# Patient Record
Sex: MALE | Race: WHITE | NOT HISPANIC OR LATINO | URBAN - METROPOLITAN AREA
[De-identification: names, ages, dates, MRNs, and addresses within clinical notes are randomized per-mention and may not be internally consistent; named-entity substitution may affect disease eponyms.]

---

## 2024-05-08 ENCOUNTER — EMERGENCY (EMERGENCY)
Facility: HOSPITAL | Age: 59
LOS: 1 days | Discharge: HOME | End: 2024-05-08
Attending: STUDENT IN AN ORGANIZED HEALTH CARE EDUCATION/TRAINING PROGRAM
Payer: MEDICARE

## 2024-05-08 VITALS
HEIGHT: 66 IN | SYSTOLIC BLOOD PRESSURE: 117 MMHG | DIASTOLIC BLOOD PRESSURE: 76 MMHG | RESPIRATION RATE: 18 BRPM | HEART RATE: 80 BPM | TEMPERATURE: 99 F | OXYGEN SATURATION: 98 % | WEIGHT: 199.96 LBS

## 2024-05-08 VITALS
RESPIRATION RATE: 18 BRPM | TEMPERATURE: 98 F | DIASTOLIC BLOOD PRESSURE: 75 MMHG | OXYGEN SATURATION: 99 % | HEART RATE: 71 BPM | SYSTOLIC BLOOD PRESSURE: 122 MMHG

## 2024-05-08 LAB
ALBUMIN SERPL ELPH-MCNC: 3.4 G/DL — LOW (ref 3.5–5)
ALP SERPL-CCNC: 57 U/L — SIGNIFICANT CHANGE UP (ref 40–120)
ALT FLD-CCNC: 45 U/L DA — SIGNIFICANT CHANGE UP (ref 10–60)
ANION GAP SERPL CALC-SCNC: 5 MMOL/L — SIGNIFICANT CHANGE UP (ref 5–17)
AST SERPL-CCNC: 30 U/L — SIGNIFICANT CHANGE UP (ref 10–40)
BASOPHILS # BLD AUTO: 0.03 K/UL — SIGNIFICANT CHANGE UP (ref 0–0.2)
BASOPHILS NFR BLD AUTO: 0.8 % — SIGNIFICANT CHANGE UP (ref 0–2)
BILIRUB SERPL-MCNC: 0.4 MG/DL — SIGNIFICANT CHANGE UP (ref 0.2–1.2)
BUN SERPL-MCNC: 18 MG/DL — SIGNIFICANT CHANGE UP (ref 7–18)
CALCIUM SERPL-MCNC: 8.7 MG/DL — SIGNIFICANT CHANGE UP (ref 8.4–10.5)
CHLORIDE SERPL-SCNC: 103 MMOL/L — SIGNIFICANT CHANGE UP (ref 96–108)
CO2 SERPL-SCNC: 27 MMOL/L — SIGNIFICANT CHANGE UP (ref 22–31)
CREAT SERPL-MCNC: 0.75 MG/DL — SIGNIFICANT CHANGE UP (ref 0.5–1.3)
EGFR: 103 ML/MIN/1.73M2 — SIGNIFICANT CHANGE UP
EOSINOPHIL # BLD AUTO: 0 K/UL — SIGNIFICANT CHANGE UP (ref 0–0.5)
EOSINOPHIL NFR BLD AUTO: 0 % — SIGNIFICANT CHANGE UP (ref 0–6)
GLUCOSE SERPL-MCNC: 115 MG/DL — HIGH (ref 70–99)
HCT VFR BLD CALC: 41.6 % — SIGNIFICANT CHANGE UP (ref 39–50)
HGB BLD-MCNC: 14.2 G/DL — SIGNIFICANT CHANGE UP (ref 13–17)
IMM GRANULOCYTES NFR BLD AUTO: 1.3 % — HIGH (ref 0–0.9)
LACTATE SERPL-SCNC: 1.3 MMOL/L — SIGNIFICANT CHANGE UP (ref 0.7–2)
LYMPHOCYTES # BLD AUTO: 1.23 K/UL — SIGNIFICANT CHANGE UP (ref 1–3.3)
LYMPHOCYTES # BLD AUTO: 31 % — SIGNIFICANT CHANGE UP (ref 13–44)
MAGNESIUM SERPL-MCNC: 1.8 MG/DL — SIGNIFICANT CHANGE UP (ref 1.6–2.6)
MANUAL SMEAR VERIFICATION: SIGNIFICANT CHANGE UP
MCHC RBC-ENTMCNC: 30.2 PG — SIGNIFICANT CHANGE UP (ref 27–34)
MCHC RBC-ENTMCNC: 34.1 GM/DL — SIGNIFICANT CHANGE UP (ref 32–36)
MCV RBC AUTO: 88.5 FL — SIGNIFICANT CHANGE UP (ref 80–100)
MONOCYTES # BLD AUTO: 0.99 K/UL — HIGH (ref 0–0.9)
MONOCYTES NFR BLD AUTO: 24.9 % — HIGH (ref 2–14)
NEUTROPHILS # BLD AUTO: 1.67 K/UL — LOW (ref 1.8–7.4)
NEUTROPHILS NFR BLD AUTO: 42 % — LOW (ref 43–77)
NRBC # BLD: 0 /100 WBCS — SIGNIFICANT CHANGE UP (ref 0–0)
PHOSPHATE SERPL-MCNC: 3 MG/DL — SIGNIFICANT CHANGE UP (ref 2.5–4.5)
PLAT MORPH BLD: NORMAL — SIGNIFICANT CHANGE UP
PLATELET # BLD AUTO: 73 K/UL — LOW (ref 150–400)
PLATELET COUNT - ESTIMATE: ABNORMAL
POTASSIUM SERPL-MCNC: 4.2 MMOL/L — SIGNIFICANT CHANGE UP (ref 3.5–5.3)
POTASSIUM SERPL-SCNC: 4.2 MMOL/L — SIGNIFICANT CHANGE UP (ref 3.5–5.3)
PROT SERPL-MCNC: 6.6 G/DL — SIGNIFICANT CHANGE UP (ref 6–8.3)
RBC # BLD: 4.7 M/UL — SIGNIFICANT CHANGE UP (ref 4.2–5.8)
RBC # FLD: 12.8 % — SIGNIFICANT CHANGE UP (ref 10.3–14.5)
RBC BLD AUTO: NORMAL — SIGNIFICANT CHANGE UP
SODIUM SERPL-SCNC: 135 MMOL/L — SIGNIFICANT CHANGE UP (ref 135–145)
WBC # BLD: 3.97 K/UL — SIGNIFICANT CHANGE UP (ref 3.8–10.5)
WBC # FLD AUTO: 3.97 K/UL — SIGNIFICANT CHANGE UP (ref 3.8–10.5)

## 2024-05-08 PROCEDURE — 96374 THER/PROPH/DIAG INJ IV PUSH: CPT

## 2024-05-08 PROCEDURE — 36415 COLL VENOUS BLD VENIPUNCTURE: CPT

## 2024-05-08 PROCEDURE — 83605 ASSAY OF LACTIC ACID: CPT

## 2024-05-08 PROCEDURE — 85025 COMPLETE CBC W/AUTO DIFF WBC: CPT

## 2024-05-08 PROCEDURE — 93005 ELECTROCARDIOGRAM TRACING: CPT

## 2024-05-08 PROCEDURE — 99285 EMERGENCY DEPT VISIT HI MDM: CPT | Mod: 25

## 2024-05-08 PROCEDURE — 80053 COMPREHEN METABOLIC PANEL: CPT

## 2024-05-08 PROCEDURE — 70450 CT HEAD/BRAIN W/O DYE: CPT | Mod: MC

## 2024-05-08 PROCEDURE — 99285 EMERGENCY DEPT VISIT HI MDM: CPT

## 2024-05-08 PROCEDURE — 84100 ASSAY OF PHOSPHORUS: CPT

## 2024-05-08 PROCEDURE — 83735 ASSAY OF MAGNESIUM: CPT

## 2024-05-08 PROCEDURE — 70450 CT HEAD/BRAIN W/O DYE: CPT | Mod: 26,MC

## 2024-05-08 RX ORDER — LIDOCAINE 4 G/100G
1 CREAM TOPICAL ONCE
Refills: 0 | Status: COMPLETED | OUTPATIENT
Start: 2024-05-08 | End: 2024-05-08

## 2024-05-08 RX ORDER — LEVETIRACETAM 250 MG/1
1500 TABLET, FILM COATED ORAL ONCE
Refills: 0 | Status: COMPLETED | OUTPATIENT
Start: 2024-05-08 | End: 2024-05-08

## 2024-05-08 RX ORDER — IBUPROFEN 200 MG
600 TABLET ORAL ONCE
Refills: 0 | Status: COMPLETED | OUTPATIENT
Start: 2024-05-08 | End: 2024-05-08

## 2024-05-08 RX ADMIN — LIDOCAINE 1 PATCH: 4 CREAM TOPICAL at 05:35

## 2024-05-08 RX ADMIN — LIDOCAINE 1 PATCH: 4 CREAM TOPICAL at 07:37

## 2024-05-08 RX ADMIN — Medication 600 MILLIGRAM(S): at 06:57

## 2024-05-08 RX ADMIN — Medication 600 MILLIGRAM(S): at 05:35

## 2024-05-08 RX ADMIN — LEVETIRACETAM 400 MILLIGRAM(S): 250 TABLET, FILM COATED ORAL at 04:10

## 2024-05-08 NOTE — ED PROVIDER NOTE - OBJECTIVE STATEMENT
58-year-old male, history of epilepsy on Keppra 500 twice daily,  presenting with concern for seizure.  Patient states that he thinks he may have had a seizure while trying to get out of bed.  Denies any loss of consciousness, tongue biting, or bowel/urinary incontinence.  Denies any head injury or loss of consciousness. No chest pain or SOB.  No fever and no chills. Pt states he has baseline tremors.   Denies any alcohol use or history of withdrawal. Patient poor historian.

## 2024-05-08 NOTE — ED PROVIDER NOTE - CLINICAL SUMMARY MEDICAL DECISION MAKING FREE TEXT BOX
58-year-old male, history of epilepsy on Keppra 500 twice daily,  presenting with concern for seizure. VSS. pt well appearing, AOx3. Neuro exam WNL, tremulous/parkinsonian-like (states this is his baseline). no tongue biting, no incontinence to suggest seizure, likely syncope. Keppra loaded empirically. CT head. EKG NSR, no WPW, Brugada, short/long QT, no AV block. Imaging, labs, reassess.

## 2024-05-08 NOTE — ED PROVIDER NOTE - NSFOLLOWUPINSTRUCTIONS_ED_ALL_ED_FT
– Take Keppra 750mg twice a day (increased from your baseline 500mg twice a day. If you already take 750mg twice a day, take 1000 mg twice a day).   – Follow up with neurologist in 2 to 3 days.   – Follow up with cardiologist in 2 to 3 days.   – Return for any worsening or concerning symptoms (see below).  – Follow up with primary care doctor in the next 5 to 7 days.    DISCHARGE INSTRUCTIONS:    Seek care immediately if:    You have sudden chest pain.    You have trouble breathing or shortness of breath.    You have vision changes, are sweating, and have nausea while you are sitting or lying down.    You feel dizzy or flushed and your heart is fluttering.    You lose consciousness.    You cannot use your arm, hand, foot, or leg, or it feels weak.    You have trouble speaking or understanding others when they speak.  Contact your healthcare provider if:    You have new or worsening symptoms.    Your heart beats faster or slower than usual.    You have questions or concerns about your condition or care.  Medicines:    Medicines may be needed to help your heart pump strongly and regularly. Your healthcare provider may also make changes to any medicines that are causing syncope.    Take your medicine as directed. Contact your healthcare provider if you think your medicine is not helping or if you have side effects. Tell him or her if you are allergic to any medicine. Keep a list of the medicines, vitamins, and herbs you take. Include the amounts, and when and why you take them. Bring the list or the pill bottles to follow-up visits. Carry your medicine list with you in case of an emergency.  Follow up with your healthcare provider as directed: You may need more tests to help find the cause of your near syncope episodes. The tests will help healthcare providers plan the best treatment for you. Write down your questions so you remember to ask them during your visits.    Manage near syncope:    Sit or lie down when needed. This includes when you feel dizzy, your throat is getting tight, and your vision changes. Raise your legs above the level of your heart.    Take slow, deep breaths if you start to breathe faster with anxiety or fear. This can help decrease dizziness and the feeling that you might faint.    Keep a record of your near syncope episodes. Include your symptoms and your activity before and after the episode. The record can help your healthcare provider find the cause of your near syncope and help you manage episodes.  Prevent a near syncope episode:    Move slowly and let yourself get used to one position before you move to another position. This is very important when you change from a lying or sitting position to a standing position. Take some deep breaths before you stand up from a lying position. Stand up slowly. Sudden movements may cause a fainting spell. Sit on the side of the bed or couch for a few minutes before you stand up. If you are on bedrest, try to be upright for about 2 hours each day, or as directed. Do not lock your legs if you are standing for a long period of time. Move your legs and bend your knees to keep blood flowing.    Follow your healthcare provider's recommendations. Your provider may recommend that you drink more liquids to prevent dehydration. You may also need to have more salt to keep your blood pressure from dropping too low and causing syncope. Your provider will tell you how much liquid and sodium to have each day. He or she will also tell you how much physical activity is safe for you. This will depend on what is causing your near syncope.    Watch for signs of low blood sugar. These include hunger, nervousness, sweating, and fast or fluttery heartbeats. Talk with your healthcare provider about ways to keep your blood sugar level steady.    Check your blood pressure often. This is important if you take medicine to lower your blood pressure. Check your blood pressure when you are lying down and when you are standing. Ask how often to check during the day. Keep a record of your blood pressure numbers. Your healthcare provider may use the record to help plan your treatment.  How to take a Blood Pressure      Do not strain if you are constipated. You may faint if you strain to have a bowel movement. Walking is the best way to get your bowels moving. Eat foods high in fiber to make it easier to have a bowel movement. Good examples are high-fiber cereals, beans, vegetables, and whole-grain breads. Prune juice may help make bowel movements softer.      Do not exercise outside on a hot day. The combination of physical activity and heat can lead to dehydration. This can cause syncope.

## 2024-05-08 NOTE — ED PROVIDER NOTE - PATIENT PORTAL LINK FT
You can access the FollowMyHealth Patient Portal offered by [facility] by registering at the following website: [email or web address]. By joining [employer]’s FollowMyHealth portal, you will also be able to view your health information using other applications (apps) compatible with our system.

## 2024-05-08 NOTE — ED ADULT NURSE REASSESSMENT NOTE - NS ED NURSE REASSESS COMMENT FT1
Patient resting in bed, A&OX3, awaiting ambulate transportation service. No acute distress noted, denies chest pain, no SOB.

## 2024-05-08 NOTE — ED PROVIDER NOTE - PHYSICAL EXAMINATION
Gen: NAD  Head: NCAT  Eyes: EOMI, PERRLA  ENT: no tongue biting  Resp: CTAB, no W/R/R  CV: RRR, +S1/S2, no M/R/G  GI: Abdomen soft non-distended, NTTP, no masses  MSK: No open wounds, no bruising, no lower extremity edema  Neuro: +upper ext tremors. A&Ox3, sensation nl, MAEx4, follows commands. able to ambulate  Ext: no edema, no deformity, warm and well-perfused  Skin: no rash or bruising

## 2024-05-08 NOTE — ED PROVIDER NOTE - PROGRESS NOTE DETAILS
Labs reviewed, all within normal limits, normal EKG.   CT head negative.   instructed patient to take 750 Keppra twice a day and instead of 500 twice a day, to follow-up with neurologist in the next 2 to 3 days.  Patient verbalized understanding.  Will send patient back to [facility].  Will write  medications  and instructions on discharge paperwork.

## 2025-01-15 ENCOUNTER — EMERGENCY (EMERGENCY)
Facility: HOSPITAL | Age: 60
LOS: 1 days | Discharge: HOME | End: 2025-01-15
Attending: STUDENT IN AN ORGANIZED HEALTH CARE EDUCATION/TRAINING PROGRAM
Payer: MEDICARE

## 2025-01-15 VITALS
DIASTOLIC BLOOD PRESSURE: 70 MMHG | WEIGHT: 179.9 LBS | HEIGHT: 69 IN | SYSTOLIC BLOOD PRESSURE: 147 MMHG | HEART RATE: 84 BPM | RESPIRATION RATE: 18 BRPM | OXYGEN SATURATION: 96 %

## 2025-01-15 VITALS
RESPIRATION RATE: 20 BRPM | SYSTOLIC BLOOD PRESSURE: 140 MMHG | TEMPERATURE: 97 F | OXYGEN SATURATION: 96 % | DIASTOLIC BLOOD PRESSURE: 65 MMHG | HEART RATE: 76 BPM

## 2025-01-15 PROBLEM — G40.909 EPILEPSY, UNSPECIFIED, NOT INTRACTABLE, WITHOUT STATUS EPILEPTICUS: Chronic | Status: ACTIVE | Noted: 2024-05-08

## 2025-01-15 LAB
ALBUMIN SERPL ELPH-MCNC: 3.8 G/DL — SIGNIFICANT CHANGE UP (ref 3.5–5)
ALP SERPL-CCNC: 52 U/L — SIGNIFICANT CHANGE UP (ref 40–120)
ALT FLD-CCNC: 37 U/L DA — SIGNIFICANT CHANGE UP (ref 10–60)
ANION GAP SERPL CALC-SCNC: 8 MMOL/L — SIGNIFICANT CHANGE UP (ref 5–17)
APPEARANCE UR: CLEAR — SIGNIFICANT CHANGE UP
APTT BLD: 31.7 SEC — SIGNIFICANT CHANGE UP (ref 24.5–35.6)
AST SERPL-CCNC: 27 U/L — SIGNIFICANT CHANGE UP (ref 10–40)
BASOPHILS # BLD AUTO: 0.05 K/UL — SIGNIFICANT CHANGE UP (ref 0–0.2)
BASOPHILS NFR BLD AUTO: 0.5 % — SIGNIFICANT CHANGE UP (ref 0–2)
BILIRUB SERPL-MCNC: 0.5 MG/DL — SIGNIFICANT CHANGE UP (ref 0.2–1.2)
BILIRUB UR-MCNC: NEGATIVE — SIGNIFICANT CHANGE UP
BUN SERPL-MCNC: 13 MG/DL — SIGNIFICANT CHANGE UP (ref 7–18)
CALCIUM SERPL-MCNC: 9.3 MG/DL — SIGNIFICANT CHANGE UP (ref 8.4–10.5)
CHLORIDE SERPL-SCNC: 96 MMOL/L — SIGNIFICANT CHANGE UP (ref 96–108)
CK SERPL-CCNC: 421 U/L — HIGH (ref 35–232)
CO2 SERPL-SCNC: 27 MMOL/L — SIGNIFICANT CHANGE UP (ref 22–31)
COLOR SPEC: YELLOW — SIGNIFICANT CHANGE UP
CREAT SERPL-MCNC: 0.69 MG/DL — SIGNIFICANT CHANGE UP (ref 0.5–1.3)
DIFF PNL FLD: NEGATIVE — SIGNIFICANT CHANGE UP
EGFR: 105 ML/MIN/1.73M2 — SIGNIFICANT CHANGE UP
EOSINOPHIL # BLD AUTO: 0.16 K/UL — SIGNIFICANT CHANGE UP (ref 0–0.5)
EOSINOPHIL NFR BLD AUTO: 1.6 % — SIGNIFICANT CHANGE UP (ref 0–6)
GLUCOSE SERPL-MCNC: 121 MG/DL — HIGH (ref 70–99)
GLUCOSE UR QL: NEGATIVE MG/DL — SIGNIFICANT CHANGE UP
HCT VFR BLD CALC: 40.3 % — SIGNIFICANT CHANGE UP (ref 39–50)
HGB BLD-MCNC: 14.4 G/DL — SIGNIFICANT CHANGE UP (ref 13–17)
IMM GRANULOCYTES NFR BLD AUTO: 0.4 % — SIGNIFICANT CHANGE UP (ref 0–0.9)
INR BLD: 1.02 RATIO — SIGNIFICANT CHANGE UP (ref 0.85–1.16)
KETONES UR-MCNC: 15 MG/DL
LEUKOCYTE ESTERASE UR-ACNC: NEGATIVE — SIGNIFICANT CHANGE UP
LYMPHOCYTES # BLD AUTO: 1.4 K/UL — SIGNIFICANT CHANGE UP (ref 1–3.3)
LYMPHOCYTES # BLD AUTO: 14.4 % — SIGNIFICANT CHANGE UP (ref 13–44)
MCHC RBC-ENTMCNC: 31.1 PG — SIGNIFICANT CHANGE UP (ref 27–34)
MCHC RBC-ENTMCNC: 35.7 G/DL — SIGNIFICANT CHANGE UP (ref 32–36)
MCV RBC AUTO: 87 FL — SIGNIFICANT CHANGE UP (ref 80–100)
MONOCYTES # BLD AUTO: 1.47 K/UL — HIGH (ref 0–0.9)
MONOCYTES NFR BLD AUTO: 15.1 % — HIGH (ref 2–14)
NEUTROPHILS # BLD AUTO: 6.63 K/UL — SIGNIFICANT CHANGE UP (ref 1.8–7.4)
NEUTROPHILS NFR BLD AUTO: 68 % — SIGNIFICANT CHANGE UP (ref 43–77)
NITRITE UR-MCNC: NEGATIVE — SIGNIFICANT CHANGE UP
NRBC # BLD: 0 /100 WBCS — SIGNIFICANT CHANGE UP (ref 0–0)
PH UR: 6 — SIGNIFICANT CHANGE UP (ref 5–8)
PLATELET # BLD AUTO: 109 K/UL — LOW (ref 150–400)
POTASSIUM SERPL-MCNC: 4 MMOL/L — SIGNIFICANT CHANGE UP (ref 3.5–5.3)
POTASSIUM SERPL-SCNC: 4 MMOL/L — SIGNIFICANT CHANGE UP (ref 3.5–5.3)
PROT SERPL-MCNC: 7 G/DL — SIGNIFICANT CHANGE UP (ref 6–8.3)
PROT UR-MCNC: NEGATIVE MG/DL — SIGNIFICANT CHANGE UP
PROTHROM AB SERPL-ACNC: 11.9 SEC — SIGNIFICANT CHANGE UP (ref 9.9–13.4)
RBC # BLD: 4.63 M/UL — SIGNIFICANT CHANGE UP (ref 4.2–5.8)
RBC # FLD: 12.5 % — SIGNIFICANT CHANGE UP (ref 10.3–14.5)
SODIUM SERPL-SCNC: 131 MMOL/L — LOW (ref 135–145)
SP GR SPEC: 1.02 — SIGNIFICANT CHANGE UP (ref 1–1.03)
TROPONIN I, HIGH SENSITIVITY RESULT: 19.2 NG/L — SIGNIFICANT CHANGE UP
TSH SERPL-MCNC: 4.93 UU/ML — HIGH (ref 0.34–4.82)
UROBILINOGEN FLD QL: 0.2 MG/DL — SIGNIFICANT CHANGE UP (ref 0.2–1)
WBC # BLD: 9.75 K/UL — SIGNIFICANT CHANGE UP (ref 3.8–10.5)
WBC # FLD AUTO: 9.75 K/UL — SIGNIFICANT CHANGE UP (ref 3.8–10.5)

## 2025-01-15 PROCEDURE — 72125 CT NECK SPINE W/O DYE: CPT | Mod: 26

## 2025-01-15 PROCEDURE — 71045 X-RAY EXAM CHEST 1 VIEW: CPT

## 2025-01-15 PROCEDURE — 73080 X-RAY EXAM OF ELBOW: CPT

## 2025-01-15 PROCEDURE — 72170 X-RAY EXAM OF PELVIS: CPT | Mod: 26

## 2025-01-15 PROCEDURE — 70450 CT HEAD/BRAIN W/O DYE: CPT | Mod: 26

## 2025-01-15 PROCEDURE — 84443 ASSAY THYROID STIM HORMONE: CPT

## 2025-01-15 PROCEDURE — 71045 X-RAY EXAM CHEST 1 VIEW: CPT | Mod: 26

## 2025-01-15 PROCEDURE — 81003 URINALYSIS AUTO W/O SCOPE: CPT

## 2025-01-15 PROCEDURE — 73080 X-RAY EXAM OF ELBOW: CPT | Mod: 26,LT

## 2025-01-15 PROCEDURE — 93005 ELECTROCARDIOGRAM TRACING: CPT

## 2025-01-15 PROCEDURE — 36415 COLL VENOUS BLD VENIPUNCTURE: CPT

## 2025-01-15 PROCEDURE — 84484 ASSAY OF TROPONIN QUANT: CPT

## 2025-01-15 PROCEDURE — 85025 COMPLETE CBC W/AUTO DIFF WBC: CPT

## 2025-01-15 PROCEDURE — 85610 PROTHROMBIN TIME: CPT

## 2025-01-15 PROCEDURE — 73130 X-RAY EXAM OF HAND: CPT | Mod: 26,50

## 2025-01-15 PROCEDURE — 93010 ELECTROCARDIOGRAM REPORT: CPT

## 2025-01-15 PROCEDURE — 73130 X-RAY EXAM OF HAND: CPT

## 2025-01-15 PROCEDURE — 82550 ASSAY OF CK (CPK): CPT

## 2025-01-15 PROCEDURE — 72170 X-RAY EXAM OF PELVIS: CPT

## 2025-01-15 PROCEDURE — 82962 GLUCOSE BLOOD TEST: CPT

## 2025-01-15 PROCEDURE — 99285 EMERGENCY DEPT VISIT HI MDM: CPT

## 2025-01-15 PROCEDURE — 70450 CT HEAD/BRAIN W/O DYE: CPT | Mod: MC

## 2025-01-15 PROCEDURE — 99285 EMERGENCY DEPT VISIT HI MDM: CPT | Mod: 25

## 2025-01-15 PROCEDURE — 80053 COMPREHEN METABOLIC PANEL: CPT

## 2025-01-15 PROCEDURE — 72125 CT NECK SPINE W/O DYE: CPT | Mod: MC

## 2025-01-15 PROCEDURE — 85730 THROMBOPLASTIN TIME PARTIAL: CPT

## 2025-01-15 RX ORDER — SODIUM CHLORIDE 9 MG/ML
1000 INJECTION, SOLUTION INTRAVENOUS ONCE
Refills: 0 | Status: COMPLETED | OUTPATIENT
Start: 2025-01-15 | End: 2025-01-15

## 2025-01-15 RX ADMIN — SODIUM CHLORIDE 1000 MILLILITER(S): 9 INJECTION, SOLUTION INTRAVENOUS at 04:04

## 2025-01-15 NOTE — ED ADULT NURSE NOTE - NSFALLRISKINTERV_ED_ALL_ED
Assistance OOB with selected safe patient handling equipment if applicable/Assistance with ambulation/Communicate fall risk and risk factors to all staff, patient, and family/Monitor gait and stability/Provide visual cue: yellow wristband, yellow gown, etc/Reinforce activity limits and safety measures with patient and family/Call bell, personal items and telephone in reach/Instruct patient to call for assistance before getting out of bed/chair/stretcher/Non-slip footwear applied when patient is off stretcher/[place] to call system/Physically safe environment - no spills, clutter or unnecessary equipment/Purposeful Proactive Rounding/Room/bathroom lighting operational, light cord in reach

## 2025-01-15 NOTE — ED PROVIDER NOTE - PROGRESS NOTE DETAILS
Lucks-DO: Patient reevaluated at bedside.  Patient comfortable no acute distress.  Labs with nonactionable findings.  CT showing age-indeterminate left maxillary fracture, no signs of facial trauma on exam, no facial tenderness.  Temperature improved, patient remains off Margaret hugger.  Will DC with outpatient follow-up/return precautions, patient understood and agreeable with plan.  Pending transport.

## 2025-01-15 NOTE — ED ADULT TRIAGE NOTE - CHIEF COMPLAINT QUOTE
Pt BIBA as per EMS police saw pt fall while walking on the sidewalk. Pt is shaking stated he is cold As per EMS pt is [facility]. Pt c/o left elbow pain Pt BIBA as per EMS police saw pt fall while walking on the sidewalk. Pt is shaking stated he is cold As per EMS pt is from [facility] and EMS stated pt stated [place] [employer]. Pt c/o left elbow pain

## 2025-01-15 NOTE — ED PROVIDER NOTE - CLINICAL SUMMARY MEDICAL DECISION MAKING FREE TEXT BOX
Sadaf: 59-year-old male with past medical history epilepsy presents status post fall.  Patient states he has been walking around outside over the past 14 hours, states he "got lost."  Patient states he tripped and fell on sidewalk onto bilateral hands, denies head strike or LOC, patient ambulatory afterwards.  Per EMS, [facility] saw patient fall walking on the sidewalk.  Patient states he is from [facility].  Patient reports mild shortness of breath and chills, but denies any fevers, headache, vision change, neck pain, back pain, chest pain, abdominal pain, vomiting, diarrhea, dysuria, numbness, focal weakness, or rash.  Physical exam per above. Patient hypothermic with rectal temp 93F, shivering. No acute traumatic injuries noted on exam. Will obtain labs, imaging, provide supportive treatment with dispo pending workup.

## 2025-01-15 NOTE — ED ADULT NURSE NOTE - CHIEF COMPLAINT QUOTE
Pt BIBA as per EMS police saw pt fall while walking on the sidewalk. Pt is shaking stated he is cold As per EMS pt is from [facility] and EMS stated pt stated he was [place] [employer]. Pt c/o left elbow pain

## 2025-01-15 NOTE — ED PROVIDER NOTE - OBJECTIVE STATEMENT
59-year-old male with past medical history epilepsy presents status post fall.  Patient states he has been walking around outside over the past 14 hours, states he "got lost."  Patient states he tripped and fell on sidewalk onto bilateral hands, denies head strike or LOC, patient ambulatory afterwards.  Per EMS, [employer] saw patient fall walking on the sidewalk.  Patient states he is from [facility].  Patient reports mild shortness of breath and chills, but denies any fevers, headache, vision change, neck pain, back pain, chest pain, abdominal pain, vomiting, diarrhea, dysuria, numbness, focal weakness, or rash.  Denies any additional complaints.

## 2025-01-15 NOTE — ED ADULT NURSE NOTE - OBJECTIVE STATEMENT
Pt presents to the ED s/p fall on sidealk today and shaking from being outside for 12 hours. Patient stated he got lost trying to get back home from [employer]. Denies LOC or head strike. Pt reports having right elbow pain radiating to hands

## 2025-01-15 NOTE — ED PROVIDER NOTE - NSFOLLOWUPINSTRUCTIONS_ED_ALL_ED_FT
Hypothermia    Hypothermia is a drop in body temperature to 95°F (35°C) or lower. The average body temperature is between 97°F (36.1°C) and 99°F (37.2°C). Hypothermia is life-threatening because the body's organs cannot work properly when the body's temperature is too low.    What are the causes?  This condition may be caused by:    Being in cold weather or a cool room without being dressed warmly enough.  Getting wet, such as from rain or from falling into cool water.  Medical conditions that affect body temperature, such as hypothyroidism, diabetes, or Parkinson disease.  Medicines that affect the body's ability to control temperature.    What increases the risk?  This condition is more likely to develop in:    People who are outside for long periods of time, such as hikers, hunters, or homeless people.  People who use drugs.  People who have difficulty moving around (impaired mobility).  Babies.  Elderly adults.  People who have a medical condition that affects body temperature.  People who take medicine that affects the body's ability to control temperature.  People who drink too much alcohol or who drink it too often.    What are the signs or symptoms?  At first, symptoms of hypothermia may include:    Shivering. Shivering may happen early in hypothermia and may stop as the condition gets worse. Hypothermia does not always cause shivering.  Slow thinking, speech, and response time.  Sleepiness.  Confusion.  Pale skin.  Puffy or swollen face.  Numbness.  In infants, skin that is red and cold.    As hypothermia gets worse, the following symptoms may develop:    Uncoordinated movements.  Slow, shallow breathing.  Slow, weak pulse.  Loss of consciousness.    How is this diagnosed?  This condition is diagnosed by taking your temperature. You may also have tests, such as:    Blood tests.  Glucose test.  Urine tests.  Liver tests.  Heart tests, such as an electrocardiogram (ECG).    How is this treated?  It is important to seek treatment as soon as possible. Hypothermia is an emergency that needs to be treated in a hospital. Treatment for hypothermia may include:    Receiving IV fluids.  Receiving warm, humidified oxygen through nasal tubing (nasal cannula), an oxygen mask, or a breathing tube.  Rewarming the blood by removing it, passing it through a hemodialysis machine, and returning it to the body at gradually increasing temperatures.  Receiving a warm solution through a small tube that goes into the stomach, bladder, or intestine (cavity lavage).    How is this prevented?     If you have an increased risk of developing hypothermia, keep your home at 68°F (20°C) or warmer.  Stay alert for dangerous weather situations and plan accordingly. Keep warm items in your house and car and observe safety precautions from government or weather officials.  Get out of cold water right away, and replace wet clothing with dry clothing as soon as possible.  Do not ignore signs that you are cold, such as shivering, cold feet and hands, and numbness.  If you know you will be in a cold environment:    Wear layers of clothing.  Do not drink alcohol. Alcohol causes your blood vessels to get larger (dilate) which makes you lose body heat.  Cover up with blankets.  Wear a waterproof jacket if you are outside in the rain or snow.    Get help right away if:  You develop symptoms of hypothermia.  Hypothermia symptoms get worse or do not get better.

## 2025-01-15 NOTE — ED PROVIDER NOTE - PATIENT PORTAL LINK FT
You can access the FollowMyHealth Patient Portal offered by [facility] by registering at the following website: [email or web address]. By joining [employer]’s FollowMyHealth portal, you will also be able to view your health information using other applications (apps) compatible with our system.

## 2025-01-15 NOTE — ED PROVIDER NOTE - PHYSICAL EXAMINATION
CONSTITUTIONAL: non-toxic, well appearing, + airway intact, GCS 15, rigoring  SKIN: no rash, no petechiae. no ecchymosis, no lacerations  EYES: PERRL, EOMI,  ENT: no hemotympanum, tongue midline,  NECK: Supple; no cervical-thoracic-lumbar spine tenderness  CARD: RRR, equal radial pulses bilaterally 2+  RESP: CTAB, no respiratory distress, no crepitus over chest wall  ABD: Soft, non-tender, non-distended  PELVIS: stable  EXT: Normal ROM x4. no bony tenderness, equal strength bilaterally  NEURO: Alert, oriented. CN2-12 intact, equal strength bilaterally  PSYCH: Cooperative, appropriate.

## 2025-01-15 NOTE — ED ADULT TRIAGE NOTE - RESPIRATORY RATE (BREATHS/MIN)
16
H/O total knee replacement, right    History of cholecystectomy  1980's  History of left cataract surgery  1990's  S/P FERNANDO-BSO (total abdominal hysterectomy and bilateral salpingo-oophorectomy)  1980's

## 2025-06-20 ENCOUNTER — EMERGENCY (EMERGENCY)
Facility: HOSPITAL | Age: 60
LOS: 1 days | End: 2025-06-20
Attending: STUDENT IN AN ORGANIZED HEALTH CARE EDUCATION/TRAINING PROGRAM
Payer: MEDICARE

## 2025-06-20 VITALS
HEART RATE: 75 BPM | WEIGHT: 199.96 LBS | TEMPERATURE: 98 F | RESPIRATION RATE: 18 BRPM | HEIGHT: 69 IN | OXYGEN SATURATION: 95 % | DIASTOLIC BLOOD PRESSURE: 77 MMHG | SYSTOLIC BLOOD PRESSURE: 127 MMHG

## 2025-06-20 VITALS
RESPIRATION RATE: 18 BRPM | TEMPERATURE: 98 F | DIASTOLIC BLOOD PRESSURE: 83 MMHG | SYSTOLIC BLOOD PRESSURE: 162 MMHG | HEART RATE: 78 BPM | OXYGEN SATURATION: 97 %

## 2025-06-20 PROCEDURE — 99283 EMERGENCY DEPT VISIT LOW MDM: CPT | Mod: 25

## 2025-06-20 PROCEDURE — 99284 EMERGENCY DEPT VISIT MOD MDM: CPT

## 2025-06-20 PROCEDURE — 82962 GLUCOSE BLOOD TEST: CPT

## 2025-06-20 PROCEDURE — 93005 ELECTROCARDIOGRAM TRACING: CPT

## 2025-06-20 NOTE — ED PROVIDER NOTE - PHYSICAL EXAMINATION
Gen: no acute distress  Head: normocephalic  Lung: CTAB, no respiratory distress, no wheezing, rales, rhonchi  CV: normal s1/s2, rrr,   Abd: soft, non-tender, non-distended  MSK: full range of motion in all 4 extremities  Neuro: No focal neurologic deficits

## 2025-06-20 NOTE — ED PROVIDER NOTE - NSFOLLOWUPINSTRUCTIONS_ED_ALL_ED_FT
Lightheadedness    WHAT YOU NEED TO KNOW:    Lightheadedness is the feeling that you may faint, but you do not. Your heartbeat may be fast or feel like it flutters. Lightheadedness may occur when you take certain medicines, such as medicine to lower your blood pressure. Dehydration, low sodium, low blood sugar, an abnormal heart rhythm, and anxiety are other common causes.    DISCHARGE INSTRUCTIONS:    Return to the emergency department if:    You have sudden chest pain.    You have trouble breathing or shortness of breath.    You have vision changes, are sweating, and have nausea while you are sitting or lying down.    You feel flushed and your heart is fluttering.    You faint.  Contact your healthcare provider if:    You feel lightheaded often.    Your heart beats faster or slower than usual.    You have questions or concerns about your condition or care.  Follow up with your healthcare provider as directed: You may need more tests to help find the cause of your lightheadedness. The tests will help healthcare providers plan the best treatment for you. Write down your questions so you remember to ask them during your visits.    Self-care: Talk with your healthcare provider about these and other ways to manage your symptoms:    Lie down when you feel lightheaded, your throat gets tight, or your vision changes. Raise your legs above the level of your heart.    Stand up slowly. Sit on the side of the bed or couch for a few minutes before you stand up.    Take slow, deep breaths when you feel lightheaded. This can help decrease the feeling that you might faint.    Ask if you need to avoid hot baths and saunas. These may make your symptoms worse.  Watch for signs of low blood sugar: These include hunger, nervousness, sweating, and fast or fluttery heartbeats. Talk with your healthcare provider about ways to keep your blood sugar level steady.    Check your blood pressure often: You should do this especially if you take medicine to lower your blood pressure. Check your blood pressure when you are lying down and when you are standing. Ask how often to check during the day. Keep a record of your blood pressure numbers. Your healthcare provider may use the record to help plan your treatment.    Keep a record of your lightheadedness episodes: Include your symptoms and your activity before and after the

## 2025-06-20 NOTE — ED ADULT TRIAGE NOTE - NS ED NURSE DIRECT TO ROOM YN
Yes
Concern for possible intussusception given presentation of symptoms versus possible constipation versus gas buildup will assess with x-ray dispo pending results

## 2025-06-20 NOTE — ED PROVIDER NOTE - CLINICAL SUMMARY MEDICAL DECISION MAKING FREE TEXT BOX
Patient is a 59-year-old male with past medical history epilepsy [place] after patient thought he was going to have a seizure.  Contrary to triage note, patient denies any dizziness or room spinning, denies any headache, blurry vision, blood thinners, history, LOC, chest pain, shortness of breath.  Patient states he felt lightheaded and thought he was going to have a seizure however did not have a seizure.  Mentating well, demonstrating full capacity, oriented to person place, time, situation, following commands.  No focal neurological deficits.  No seizure-like activity.  No postictal confusion, no tongue biting or urinary incontinence.  GCS 15.   Patient does not want any lab work or imaging.    -   Tolerating p.o.  Fingerstick and EKG within normal limits.  Will DC back to facility.

## 2025-06-20 NOTE — ED PROVIDER NOTE - PATIENT PORTAL LINK FT
You can access the FollowMyHealth Patient Portal offered by [facility] by registering at the following website: [email or web address]. By joining [employer]’s FollowMyHealth portal, you will also be able to view your health information using other applications (apps) compatible with our system.

## 2025-06-20 NOTE — ED PROVIDER NOTE - OBJECTIVE STATEMENT
Patient is a 59-year-old male with past medical history epilepsy [place] after patient thought he was going to have a seizure.  Contrary to triage note, patient denies any dizziness or room spinning, denies any headache, blurry vision, blood thinners, history, LOC, chest pain, shortness of breath.  Patient states he felt lightheaded and thought he was going to have a seizure however did not have a seizure.  Mentating well, demonstrating full capacity, oriented to person place, time, situation, following commands.  No focal neurological deficits.  No seizure-like activity.  No postictal confusion, no tongue biting or urinary incontinence.  GCS 15.   Patient does not want any lab work or imaging.  Patient requesting food and to go back to his facility ([facility]).

## 2025-06-20 NOTE — ED ADULT NURSE NOTE - OBJECTIVE STATEMENT
[place], c/o dizziness. Denies any pain or dizziness at this time.

## 2025-06-20 NOTE — ED ADULT TRIAGE NOTE - CCCP TRG CHIEF CMPLNT
I have personally evaluated and examined the patient. The Attending was available to me as a supervising provider if needed.
dizziness

## 2025-06-20 NOTE — ED ADULT TRIAGE NOTE - BEFAST EYES
Detail Level: Detailed Quality 226: Preventive Care And Screening: Tobacco Use: Screening And Cessation Intervention: Patient screened for tobacco use and is an ex/non-smoker Quality 431: Preventive Care And Screening: Unhealthy Alcohol Use - Screening: Patient screened for unhealthy alcohol use using a single question and scores less than 2 times per year Quality 130: Documentation Of Current Medications In The Medical Record: Current Medications Documented Quality 431: Preventive Care And Screening: Unhealthy Alcohol Use - Screening: Patient not identified as an unhealthy alcohol user when screened for unhealthy alcohol use using a systematic screening method No

## 2025-06-20 NOTE — ED ADULT TRIAGE NOTE - CHIEF COMPLAINT QUOTE
[place] the [place] security found pt on the ground state was dizzy and decided to sit down Hx seizure